# Patient Record
Sex: MALE | Race: WHITE | Employment: FULL TIME | URBAN - NONMETROPOLITAN AREA
[De-identification: names, ages, dates, MRNs, and addresses within clinical notes are randomized per-mention and may not be internally consistent; named-entity substitution may affect disease eponyms.]

---

## 2018-12-11 ENCOUNTER — APPOINTMENT (OUTPATIENT)
Dept: GENERAL RADIOLOGY | Age: 48
End: 2018-12-11
Payer: COMMERCIAL

## 2018-12-11 ENCOUNTER — HOSPITAL ENCOUNTER (EMERGENCY)
Age: 48
Discharge: HOME | End: 2018-12-11
Attending: EMERGENCY MEDICINE
Payer: COMMERCIAL

## 2018-12-11 VITALS
HEART RATE: 73 BPM | OXYGEN SATURATION: 96 % | DIASTOLIC BLOOD PRESSURE: 94 MMHG | HEIGHT: 71 IN | BODY MASS INDEX: 31.64 KG/M2 | WEIGHT: 226 LBS | RESPIRATION RATE: 18 BRPM | TEMPERATURE: 97.4 F | SYSTOLIC BLOOD PRESSURE: 150 MMHG

## 2018-12-11 DIAGNOSIS — M25.421 ELBOW EFFUSION, RIGHT: Primary | ICD-10-CM

## 2018-12-11 LAB
ANION GAP SERPL CALCULATED.3IONS-SCNC: 13 MEQ/L (ref 8–16)
BASOPHILS # BLD: 0.5 %
BASOPHILS ABSOLUTE: 0.1 THOU/MM3 (ref 0–0.1)
BUN BLDV-MCNC: 11 MG/DL (ref 7–22)
C-REACTIVE PROTEIN: 3.13 MG/DL (ref 0–1)
CALCIUM SERPL-MCNC: 9.6 MG/DL (ref 8.5–10.5)
CHARACTER,SYN.FL: ABNORMAL
CHLORIDE BLD-SCNC: 98 MEQ/L (ref 98–111)
CO2: 27 MEQ/L (ref 23–33)
COLOR FLUID: ABNORMAL
CREAT SERPL-MCNC: 0.7 MG/DL (ref 0.4–1.2)
CRYSTALS, FLUID: ABNORMAL
EOSINOPHIL # BLD: 1.1 %
EOSINOPHILS ABSOLUTE: 0.1 THOU/MM3 (ref 0–0.4)
ERYTHROCYTE [DISTWIDTH] IN BLOOD BY AUTOMATED COUNT: 11.9 % (ref 11.5–14.5)
ERYTHROCYTE [DISTWIDTH] IN BLOOD BY AUTOMATED COUNT: 39.9 FL (ref 35–45)
GFR SERPL CREATININE-BSD FRML MDRD: > 90 ML/MIN/1.73M2
GLUCOSE BLD-MCNC: 135 MG/DL (ref 70–108)
HCT VFR BLD CALC: 44.1 % (ref 42–52)
HEMOGLOBIN: 15.1 GM/DL (ref 14–18)
IMMATURE GRANS (ABS): 0.06 THOU/MM3 (ref 0–0.07)
IMMATURE GRANULOCYTES: 0.5 %
LYMPHOCYTES # BLD: 14.9 %
LYMPHOCYTES ABSOLUTE: 1.8 THOU/MM3 (ref 1–4.8)
MCH RBC QN AUTO: 31.5 PG (ref 26–33)
MCHC RBC AUTO-ENTMCNC: 34.2 GM/DL (ref 32.2–35.5)
MCV RBC AUTO: 92.1 FL (ref 80–94)
MONOCYTES # BLD: 8.1 %
MONOCYTES ABSOLUTE: 1 THOU/MM3 (ref 0.4–1.3)
MONONUCLEAR CELLS SYNOVIAL FLUID: 71.8 % (ref 0–74)
NUCLEATED RED BLOOD CELLS: 0 /100 WBC
OSMOLALITY CALCULATION: 277.1 MOSMOL/KG (ref 275–300)
PATHOLOGIST REVIEW: ABNORMAL
PLATELET # BLD: 175 THOU/MM3 (ref 130–400)
PMV BLD AUTO: 11.4 FL (ref 9.4–12.4)
POLYMORPHONUCLEAR CELLS SYNOVIAL FLUID: 28.2 % (ref 0–24)
POTASSIUM SERPL-SCNC: 4.1 MEQ/L (ref 3.5–5.2)
RBC # BLD: 4.79 MILL/MM3 (ref 4.7–6.1)
SEDIMENTATION RATE, ERYTHROCYTE: 16 MM/HR (ref 0–10)
SEG NEUTROPHILS: 74.9 %
SEGMENTED NEUTROPHILS ABSOLUTE COUNT: 9.2 THOU/MM3 (ref 1.8–7.7)
SODIUM BLD-SCNC: 138 MEQ/L (ref 135–145)
TOTAL NUCLEATED CELLS SYNOVIAL: 145 /CUMM (ref 0–150)
TOTAL VOLUME RECEIVED SYNOVIAL: 10 ML
URIC ACID: 7.4 MG/DL (ref 3.7–7)
WBC # BLD: 12.3 THOU/MM3 (ref 4.8–10.8)

## 2018-12-11 PROCEDURE — 6360000002 HC RX W HCPCS: Performed by: EMERGENCY MEDICINE

## 2018-12-11 PROCEDURE — 84550 ASSAY OF BLOOD/URIC ACID: CPT

## 2018-12-11 PROCEDURE — 73080 X-RAY EXAM OF ELBOW: CPT

## 2018-12-11 PROCEDURE — 36415 COLL VENOUS BLD VENIPUNCTURE: CPT

## 2018-12-11 PROCEDURE — 96365 THER/PROPH/DIAG IV INF INIT: CPT

## 2018-12-11 PROCEDURE — 89060 EXAM SYNOVIAL FLUID CRYSTALS: CPT

## 2018-12-11 PROCEDURE — 86140 C-REACTIVE PROTEIN: CPT

## 2018-12-11 PROCEDURE — 99283 EMERGENCY DEPT VISIT LOW MDM: CPT

## 2018-12-11 PROCEDURE — 87075 CULTR BACTERIA EXCEPT BLOOD: CPT

## 2018-12-11 PROCEDURE — 85025 COMPLETE CBC W/AUTO DIFF WBC: CPT

## 2018-12-11 PROCEDURE — 87070 CULTURE OTHR SPECIMN AEROBIC: CPT

## 2018-12-11 PROCEDURE — 89050 BODY FLUID CELL COUNT: CPT

## 2018-12-11 PROCEDURE — 96375 TX/PRO/DX INJ NEW DRUG ADDON: CPT

## 2018-12-11 PROCEDURE — 87205 SMEAR GRAM STAIN: CPT

## 2018-12-11 PROCEDURE — 96376 TX/PRO/DX INJ SAME DRUG ADON: CPT

## 2018-12-11 PROCEDURE — 20605 DRAIN/INJ JOINT/BURSA W/O US: CPT

## 2018-12-11 PROCEDURE — 6360000002 HC RX W HCPCS

## 2018-12-11 PROCEDURE — 85651 RBC SED RATE NONAUTOMATED: CPT

## 2018-12-11 PROCEDURE — 80048 BASIC METABOLIC PNL TOTAL CA: CPT

## 2018-12-11 RX ORDER — LIDOCAINE HYDROCHLORIDE 10 MG/ML
INJECTION, SOLUTION INFILTRATION; PERINEURAL
Status: DISCONTINUED
Start: 2018-12-11 | End: 2018-12-11 | Stop reason: HOSPADM

## 2018-12-11 RX ORDER — MORPHINE SULFATE 4 MG/ML
4 INJECTION, SOLUTION INTRAMUSCULAR; INTRAVENOUS ONCE
Status: COMPLETED | OUTPATIENT
Start: 2018-12-11 | End: 2018-12-11

## 2018-12-11 RX ORDER — METOPROLOL SUCCINATE 100 MG/1
100 TABLET, EXTENDED RELEASE ORAL DAILY
COMMUNITY

## 2018-12-11 RX ORDER — HYDROCODONE BITARTRATE AND ACETAMINOPHEN 5; 325 MG/1; MG/1
1 TABLET ORAL EVERY 6 HOURS PRN
COMMUNITY

## 2018-12-11 RX ORDER — MORPHINE SULFATE 4 MG/ML
INJECTION, SOLUTION INTRAMUSCULAR; INTRAVENOUS
Status: COMPLETED
Start: 2018-12-11 | End: 2018-12-11

## 2018-12-11 RX ORDER — MORPHINE SULFATE 4 MG/ML
INJECTION, SOLUTION INTRAMUSCULAR; INTRAVENOUS
Status: DISCONTINUED
Start: 2018-12-11 | End: 2018-12-11 | Stop reason: HOSPADM

## 2018-12-11 RX ORDER — LISINOPRIL 5 MG/1
5 TABLET ORAL DAILY
COMMUNITY

## 2018-12-11 RX ORDER — ONDANSETRON 2 MG/ML
4 INJECTION INTRAMUSCULAR; INTRAVENOUS ONCE
Status: COMPLETED | OUTPATIENT
Start: 2018-12-11 | End: 2018-12-11

## 2018-12-11 RX ORDER — SULFAMETHOXAZOLE AND TRIMETHOPRIM 800; 160 MG/1; MG/1
1 TABLET ORAL 2 TIMES DAILY
Qty: 14 TABLET | Refills: 0 | Status: SHIPPED | OUTPATIENT
Start: 2018-12-11 | End: 2018-12-18

## 2018-12-11 RX ORDER — PREDNISONE 20 MG/1
20 TABLET ORAL 2 TIMES DAILY
Qty: 10 TABLET | Refills: 0 | Status: SHIPPED | OUTPATIENT
Start: 2018-12-11 | End: 2018-12-16

## 2018-12-11 RX ADMIN — MORPHINE SULFATE 4 MG: 4 INJECTION, SOLUTION INTRAMUSCULAR; INTRAVENOUS at 09:00

## 2018-12-11 RX ADMIN — MORPHINE SULFATE 4 MG: 4 INJECTION, SOLUTION INTRAMUSCULAR; INTRAVENOUS at 06:28

## 2018-12-11 RX ADMIN — ONDANSETRON 4 MG: 2 INJECTION INTRAMUSCULAR; INTRAVENOUS at 05:54

## 2018-12-11 RX ADMIN — Medication 2 G: at 10:31

## 2018-12-11 RX ADMIN — MORPHINE SULFATE 4 MG: 4 INJECTION, SOLUTION INTRAMUSCULAR; INTRAVENOUS at 05:54

## 2018-12-11 RX ADMIN — HYDROMORPHONE HYDROCHLORIDE 1 MG: 1 INJECTION, SOLUTION INTRAMUSCULAR; INTRAVENOUS; SUBCUTANEOUS at 10:21

## 2018-12-11 ASSESSMENT — PAIN SCALES - GENERAL
PAINLEVEL_OUTOF10: 10
PAINLEVEL_OUTOF10: 10
PAINLEVEL_OUTOF10: 9
PAINLEVEL_OUTOF10: 9
PAINLEVEL_OUTOF10: 10
PAINLEVEL_OUTOF10: 10
PAINLEVEL_OUTOF10: 9
PAINLEVEL_OUTOF10: 9

## 2018-12-11 ASSESSMENT — ENCOUNTER SYMPTOMS
SORE THROAT: 0
WHEEZING: 0
DIARRHEA: 0
SHORTNESS OF BREATH: 0
ABDOMINAL PAIN: 0
BACK PAIN: 0
RHINORRHEA: 0
EYE REDNESS: 0
VOMITING: 0
COUGH: 0
NAUSEA: 0
EYE DISCHARGE: 0

## 2018-12-11 ASSESSMENT — PAIN DESCRIPTION - DESCRIPTORS
DESCRIPTORS: CONSTANT;CRUSHING
DESCRIPTORS: ACHING
DESCRIPTORS: CONSTANT;CRUSHING

## 2018-12-11 ASSESSMENT — PAIN DESCRIPTION - FREQUENCY
FREQUENCY: CONTINUOUS
FREQUENCY: CONTINUOUS

## 2018-12-11 ASSESSMENT — PAIN DESCRIPTION - ORIENTATION
ORIENTATION: RIGHT

## 2018-12-11 ASSESSMENT — PAIN DESCRIPTION - PROGRESSION: CLINICAL_PROGRESSION: GRADUALLY IMPROVING

## 2018-12-11 ASSESSMENT — PAIN DESCRIPTION - LOCATION
LOCATION: WRIST

## 2018-12-11 ASSESSMENT — PAIN DESCRIPTION - PAIN TYPE
TYPE: ACUTE PAIN

## 2018-12-11 NOTE — CONSULTS
15.1 12/11/2018     12/11/2018     BMP:  Lab Results   Component Value Date     12/11/2018    K 4.1 12/11/2018    CL 98 12/11/2018    CO2 27 12/11/2018    BUN 11 12/11/2018    CREATININE 0.7 12/11/2018    CALCIUM 9.6 12/11/2018    GLUCOSE 135 12/11/2018     PT/INR:  No results found for: PROTIME, INR  Troponin:  No results found for: TROPONINI  No results for input(s): LIPASE, AMYLASE in the last 72 hours. No results for input(s): AST, ALT, BILIDIR, BILITOT, ALKPHOS in the last 72 hours. Radiology:   Xr Elbow Right (min 3 Views)    Result Date: 12/11/2018  PROCEDURE: XR ELBOW RIGHT (MIN 3 VIEWS) CLINICAL INFORMATION: ELBOW PAIN, . Increasing elbow pain. Right elbow swelling. Pain radiating down into the wrist. COMPARISON: No prior study. TECHNIQUE: 4 views of the right elbow. FINDINGS: There is no fracture or dislocation. There is no joint effusion. There is diffuse soft tissue swelling particularly posteriorly. There is no radiopaque foreign body. There is an enthesophyte arising from the lateral epicondyle. There is a small osteophyte of the coronoid process of the olecranon. 1. Diffuse soft tissue swelling. Mild degenerative changes. **This report has been created using voice recognition software. It may contain minor errors which are inherent in voice recognition technology. ** Final report electronically signed by Dr. Dylan Guerra on 12/11/2018 6:52 AM      ASSESSMENT:Active Problems:    * No active hospital problems. *  Resolved Problems:    * No resolved hospital problems. *   right olecranon bursitis    PLAN as discussed with Dr. Benny Santacruz:  FU with Dr. Benny Santacruz tomorrow at [facility] at 18, possible I and D and bursectomy Thursday. Continue Norco-call pain management MD if need increased  Recommend Medrol pack  Continue Clindamycin  RICE  Apply ace wrist to elbow.        Electronically signed by GERALD Alejo CNP on 12/11/2018 at 11:14 AM

## 2018-12-11 NOTE — ED PROVIDER NOTES
history on file. SURGICAL HISTORY    has no past surgical history on file. CURRENT MEDICATIONS       Discharge Medication List as of 12/11/2018 11:29 AM      CONTINUE these medications which have NOT CHANGED    Details   HYDROcodone-acetaminophen (NORCO) 5-325 MG per tablet Take 1 tablet by mouth every 6 hours as needed for Pain. [facility] Historical Med      metoprolol succinate (TOPROL XL) 100 MG extended release tablet Take 100 mg by mouth dailyHistorical Med      lisinopril (PRINIVIL;ZESTRIL) 5 MG tablet Take 5 mg by mouth dailyHistorical Med             ALLERGIES     has No Known Allergies. FAMILY HISTORY     has no family status information on file. family history is not on file. SOCIAL HISTORY          PHYSICAL EXAM     INITIAL VITALS:  height is 5' 11\" (1.803 m) and weight is 226 lb (102.5 kg). His oral temperature is 97.4 °F (36.3 °C). His blood pressure is 150/94 (abnormal) and his pulse is 73. His respiration is 18 and oxygen saturation is 96%. Physical Exam   Constitutional: He is oriented to person, place, and time. He appears well-developed and well-nourished. Non-toxic appearance. HENT:   Head: Normocephalic and atraumatic. Right Ear: Tympanic membrane and external ear normal.   Left Ear: Tympanic membrane and external ear normal.   Nose: Nose normal.   Mouth/Throat: Oropharynx is clear and moist and mucous membranes are normal. No oropharyngeal exudate, posterior oropharyngeal edema or posterior oropharyngeal erythema. Eyes: Conjunctivae and EOM are normal.   Neck: Normal range of motion. Neck supple. No JVD present. Cardiovascular: Normal rate, regular rhythm, normal heart sounds, intact distal pulses and normal pulses. Exam reveals no gallop and no friction rub. No murmur heard. Pulmonary/Chest: Effort normal and breath sounds normal. No respiratory distress. He has no decreased breath sounds. He has no wheezes. He has no rhonchi. He has no rales. Abdominal: Soft.  Bowel Abnormal; Notable for the following:     Uric Acid 7.4 (*)     All other components within normal limits   SYNOVIAL FLUID, CELL COUNT - Abnormal; Notable for the following:     Polymorphonuclear Cells Synovial Fluid 28.2 (*)     All other components within normal limits   BODY FLUID CULTURE    Narrative:     Source: synovial fluid       Site: R elbow          Current Antibiotics: Clindamycin   ANION GAP   GLOMERULAR FILTRATION RATE, ESTIMATED   OSMOLALITY       EMERGENCY DEPARTMENT COURSE:   Vitals:    Vitals:    12/11/18 0622 12/11/18 0853 12/11/18 1016 12/11/18 1114   BP: (!) 146/91 (!) 153/86 (!) 173/95 (!) 150/94   Pulse: 68 69 73 73   Resp: 18 18 18 18   Temp:       TempSrc:       SpO2: 98% 96% 97% 96%   Weight:       Height:           5:43 AM: The patient was seen and evaluated. MDM:  Pt presenting with effusion of the right elbow, he has edema of the forearm as well. Patient is afebrile. Joint aspiration done, fluid sent to lab. Patient signed out to Dr. Renetta Conrad for final disposition. CRITICAL CARE:       CONSULTS:      PROCEDURES:  Arthrocentesis  Date/Time: 12/13/2018 7:42 AM  Performed by: Tresa Bass  Authorized by: Randall Tinoco     Consent:     Consent obtained:  Verbal    Consent given by:  Patient    Risks discussed:  Bleeding, infection, pain and incomplete drainage    Alternatives discussed:  Delayed treatment  Location:     Location:  Elbow    Elbow:  R elbow  Anesthesia (see MAR for exact dosages):      Anesthesia method:  Local infiltration    Local anesthetic:  Lidocaine 1% w/o epi  Procedure details:     Preparation: Patient was prepped and draped in usual sterile fashion      Needle gauge:  18 G    Ultrasound guidance: no      Approach:  Inferior    Aspirate amount:  Minimal    Aspirate characteristics:  Yellow and cloudy    Steroid injected: no      Specimen collected: yes    Post-procedure details:     Dressing:  Adhesive bandage         FINAL IMPRESSION      1. Elbow effusion,

## 2018-12-16 LAB
ANAEROBIC CULTURE: NORMAL
BODY FLUID CULTURE, STERILE: NORMAL
GRAM STAIN RESULT: NORMAL